# Patient Record
(demographics unavailable — no encounter records)

---

## 2017-06-05 NOTE — DIAGNOSTIC IMAGING REPORT
ABDOMINAL ULTRASOUND, RIGHT UPPER QUADRANT



HISTORY:     Right upper quadrant abdominal pain.



COMPARISON:  None.



FINDINGS: This exam is compromised by suboptimal penetration. Liver morphology

is normal and no hepatic lesions are identified although sensitivity is

diminished on this exam. The pancreas is largely obscured. There is no

gallbladder wall thickening. Small echogenic focus within the gallbladder could

reflect a small polyp or stone. There is no biliary ductal dilatation. There is

no right hydronephrosis. There are suspected right renal calculi.



IMPRESSION: 





1. Study compromised by suboptimal penetration.



2. Suspected right-sided nephrolithiasis. No right hydronephrosis.



3. Obscured pancreas.



4. Small echogenic abnormality within the gallbladder which could reflect a

small polyp or gallstone. No evidence of acute cholecystitis.







Electronically signed by:  Ramon Zheng M.D.

6/5/2017 1:26 PM



Dictated Date/Time:  6/5/2017 1:24 PM

## 2017-06-05 NOTE — DIAGNOSTIC IMAGING REPORT
CT SCAN OF THE ABDOMEN AND PELVIS WITH IV CONTRAST



CLINICAL HISTORY:   Generalized abdominal pain.



COMPARISON STUDY:  Abdominal ultrasound dated 6/5/2017.



TECHNIQUE: Following the IV administration of  93 cc of Optiray 320, CT scan of

the abdomen and pelvis is performed from the lung bases to the proximal femora.

Images are reviewed in the axial, sagittal, and coronal planes. IV contrast was

administered without complication. Automated dose control exposure was utilized.



CT DOSE: 598.17 mGy.cm



FINDINGS:



Lung bases: The heart is normal in size and without pericardial effusion. The

lung bases are clear.



Liver: The contrast-enhanced liver is normal in size, contour, and attenuation.

There is no intrahepatic biliary ductal dilatation. The hepatic veins and portal

veins are patent.



Gallbladder: There are small calcified gallstones. The gallbladder is otherwise

normal in appearance.



Spleen: Normal in size and attenuation.



Pancreas: Unremarkable.



Adrenal glands: Unremarkable.



Kidneys: The contrast enhanced kidneys are normal in size and without

hydronephrosis. The kidneys enhance symmetrically. There are numerous greater

than 10) nonobstructing right renal calculi measuring up to 6 mm. No left renal

calculi are seen.



Abdominal vasculature: The abdominal aorta is normal in course and caliber.

There is a small fat-containing umbilical hernia.



Bowel: The small bowel and colon are normal in course and caliber. The appendix

is  well-visualized and normal. 



Peritoneum: There is no intraperitoneal free air or abdominal ascites.



Lymphadenopathy: None.



Pelvic viscera: There are small foci of gas within the bladder lumen. The

bladder, uterus, and adnexa are otherwise normal as visualized. Bilateral

ovarian follicles are noted.



Skeletal structures: No lytic or blastic lesions are seen.





IMPRESSION:



1. There are small foci of gas within the bladder lumen. No bladder wall

thickening is identified. This is nonspecific and may be related to recent

instrumentation. Correlate clinically and with urinalysis for evidence of

cystitis.



2. Tiny calcified gallstones are observed.



3. There are numerous nonobstructing right renal calculi.



4. Additional findings as above.







Electronically signed by:  Stevan Quick M.D.

6/5/2017 3:08 PM



Dictated Date/Time:  6/5/2017 3:02 PM

## 2017-06-05 NOTE — EMERGENCY ROOM VISIT NOTE
History


Report prepared by Matt:  Komal Armas


Under the Supervision of:  Dr. Won Bro M.D.


First contact with patient:  11:01


Chief Complaint:  ABDOMINAL PAIN


Stated Complaint:  SEVERE ABD. PAIN, OVER HEATED, NAUSEA


Nursing Triage Summary:  


Back pain yesterday, not present this morning, but abdominal cramps around 

0900. 


Getting worse. Occassionally feeling overheated and going to pass out.





History of Present Illness


The patient is a 31 year old female who presents to the Emergency Room with 

complaints of worsening diffuse abdominal pain for the past 2 hours. She states 

that her pain waxes and wanes and rates her pain as a 10/10 in severity. She is 

also nauseated and lightheaded. She has never experienced pain like this 

before. She has not taken anything for her pain. Last night she was 

experiencing back pain but denies any back pain at this time. She also denies 

urinary symptoms and abnormal vaginal bleeding or discharge. The patient cannot 

deny chance of pregnancy. She notes that she should start her period in about 2 

days.





   Source of History:  patient


   Onset:  2 hours ago


   Position:  abdomen


   Symptom Intensity:  10/10


   Timing:  waxes/wanes, worsening


   Associated Symptoms:  + nausea, No back pain, No urinary symptoms


Note:


Pt notes lightheadedness. Pt denies any abnormal vaginal discharge or bleeding.





Review of Systems


See HPI for pertinent positives & negatives. A total of 10 systems reviewed and 

were otherwise negative.





Past Medical & Surgical


Surgical Problems:


(1) Hx of  section








Family History





Patient reports no known family medical history.





Social History


Smoking Status:  Former Smoker


Smokeless Tobacco Use:  No


Alcohol Use:  occasionally


Marital Status:  in relationship


Housing Status:  lives with significant other


Occupation Status:  employed





Current/Historical Medications


Scheduled


Bupropion (Wellbutrin-Xl), 300 MG PO BID


Multivitamin (Multivitamin), 1 TAB PO DAILY


Sulfamethoxazole-Trimethoprim (Bactrim Ds 800MG/160MG), 1 TAB PO BID





Scheduled PRN


Oxycodone/Acetaminophen 5MG/325MG (Percocet 5MG/325MG), 1-2 TAB PO Q4H PRN for 

Pain





Allergies


Coded Allergies:  


     No Known Allergies (Unverified , 17)





Physical Exam


Vital Signs











  Date Time  Temp Pulse Resp B/P (MAP) Pulse Ox O2 Delivery O2 Flow Rate FiO2


 


17 15:52  64 16 126/87 96   


 


17 15:09  62 15  98   


 


17 15:04    114/73    


 


17 15:00  66 18 114/73 99 Room Air  


 


17 14:09  65 17  98   


 


17 14:02    110/80    


 


17 13:39  86      


 


17 13:39  62 16  97   


 


17 13:34    121/79    


 


17 12:32    118/80    


 


17 12:12  80 17  95   


 


17 12:02    133/88    


 


17 11:53    141/97    


 


17 11:42  69 15  96   


 


17 11:41    138/85    


 


17 11:12  73 18  96   


 


17 11:04  65      


 


17 11:02    131/94    


 


17 10:46 36.4 83 20 127/78 97 Room Air  











Physical Exam


GENERAL: Patient is a healthy-appearing well-nourished 31 year old female. 


HEAD: Normocephalic atraumatic


EYES: Ocular movements intact pupils equal and react to light


OROPHARYNX mucous membranes are moist no exudates present no erythema or edema 

present


NECK: Supple no nuchal rigidity


CHEST: Good equal expansion


LUNGS: Clear and equal to auscultation


CARDIAC: Normal S1 and S2


ABDOMEN: Soft, diffusely tender throughout the entire abdomen, no guarding


BACK: No CVA tenderness


EXTREMITIES: No pain upon palpation normal muscle strength in all groups no 

clubbing cyanosis or edema


NEURO: Patient is following commands is answering questions appropriately. 

Alert and oriented x3 Cranial Nerves 2-12 grossly intact





Medical Decision & Procedures


ER Provider


Diagnostic Interpretation:


Radiology results as stated below per my review and radiologist interpretation:





CT SCAN OF THE ABDOMEN AND PELVIS WITH IV CONTRAST





CLINICAL HISTORY:   Generalized abdominal pain.





COMPARISON STUDY:  Abdominal ultrasound dated 2017.





TECHNIQUE: Following the IV administration of  93 cc of Optiray 320, CT scan of


the abdomen and pelvis is performed from the lung bases to the proximal femora.


Images are reviewed in the axial, sagittal, and coronal planes. IV contrast was


administered without complication. Automated dose control exposure was utilized.





CT DOSE: 598.17 mGy.cm





FINDINGS:





Lung bases: The heart is normal in size and without pericardial effusion. The


lung bases are clear.





Liver: The contrast-enhanced liver is normal in size, contour, and attenuation.


There is no intrahepatic biliary ductal dilatation. The hepatic veins and portal


veins are patent.





Gallbladder: There are small calcified gallstones. The gallbladder is otherwise


normal in appearance.





Spleen: Normal in size and attenuation.





Pancreas: Unremarkable.





Adrenal glands: Unremarkable.





Kidneys: The contrast enhanced kidneys are normal in size and without


hydronephrosis. The kidneys enhance symmetrically. There are numerous greater


than 10) nonobstructing right renal calculi measuring up to 6 mm. No left renal


calculi are seen.





Abdominal vasculature: The abdominal aorta is normal in course and caliber.


There is a small fat-containing umbilical hernia.





Bowel: The small bowel and colon are normal in course and caliber. The appendix


is  well-visualized and normal. 





Peritoneum: There is no intraperitoneal free air or abdominal ascites.





Lymphadenopathy: None.





Pelvic viscera: There are small foci of gas within the bladder lumen. The


bladder, uterus, and adnexa are otherwise normal as visualized. Bilateral


ovarian follicles are noted.





Skeletal structures: No lytic or blastic lesions are seen.








IMPRESSION:





1. There are small foci of gas within the bladder lumen. No bladder wall


thickening is identified. This is nonspecific and may be related to recent


instrumentation. Correlate clinically and with urinalysis for evidence of


cystitis.





2. Tiny calcified gallstones are observed.





3. There are numerous nonobstructing right renal calculi.





4. Additional findings as above.











Electronically signed by:  Stevan Quick M.D.


2017 3:08 PM





Dictated Date/Time:  2017 3:02 PM














ABDOMINAL ULTRASOUND, RIGHT UPPER QUADRANT





HISTORY:     Right upper quadrant abdominal pain.





COMPARISON:  None.





FINDINGS: This exam is compromised by suboptimal penetration. Liver morphology


is normal and no hepatic lesions are identified although sensitivity is


diminished on this exam. The pancreas is largely obscured. There is no


gallbladder wall thickening. Small echogenic focus within the gallbladder could


reflect a small polyp or stone. There is no biliary ductal dilatation. There is


no right hydronephrosis. There are suspected right renal calculi.





IMPRESSION: 








1. Study compromised by suboptimal penetration.





2. Suspected right-sided nephrolithiasis. No right hydronephrosis.





3. Obscured pancreas.





4. Small echogenic abnormality within the gallbladder which could reflect a


small polyp or gallstone. No evidence of acute cholecystitis.











Electronically signed by:  Ramon Zheng M.D.


2017 1:26 PM





Dictated Date/Time:  2017 1:24 PM





Laboratory Results


17 11:10








Red Blood Count 4.40, Mean Corpuscular Volume 90.2, Mean Corpuscular Hemoglobin 

29.1, Mean Corpuscular Hemoglobin Concent 32.2, Mean Platelet Volume 9.8, 

Neutrophils (%) (Auto) 67.8, Lymphocytes (%) (Auto) 22.8, Monocytes (%) (Auto) 

7.0, Eosinophils (%) (Auto) 1.8, Basophils (%) (Auto) 0.3, Neutrophils # (Auto) 

4.17, Lymphocytes # (Auto) 1.40, Monocytes # (Auto) 0.43, Eosinophils # (Auto) 

0.11, Basophils # (Auto) 0.02





17 11:10

















Test


  17


11:10 17


11:55


 


White Blood Count


  6.15 K/uL


(4.8-10.8) 


 


 


Red Blood Count


  4.40 M/uL


(4.2-5.4) 


 


 


Hemoglobin


  12.8 g/dL


(12.0-16.0) 


 


 


Hematocrit 39.7 % (37-47)  


 


Mean Corpuscular Volume


  90.2 fL


() 


 


 


Mean Corpuscular Hemoglobin


  29.1 pg


(25-34) 


 


 


Mean Corpuscular Hemoglobin


Concent 32.2 g/dl


(32-36) 


 


 


Platelet Count


  197 K/uL


(130-400) 


 


 


Mean Platelet Volume


  9.8 fL


(7.4-10.4) 


 


 


Neutrophils (%) (Auto) 67.8 %  


 


Lymphocytes (%) (Auto) 22.8 %  


 


Monocytes (%) (Auto) 7.0 %  


 


Eosinophils (%) (Auto) 1.8 %  


 


Basophils (%) (Auto) 0.3 %  


 


Neutrophils # (Auto)


  4.17 K/uL


(1.4-6.5) 


 


 


Lymphocytes # (Auto)


  1.40 K/uL


(1.2-3.4) 


 


 


Monocytes # (Auto)


  0.43 K/uL


(0.11-0.59) 


 


 


Eosinophils # (Auto)


  0.11 K/uL


(0-0.5) 


 


 


Basophils # (Auto)


  0.02 K/uL


(0-0.2) 


 


 


RDW Standard Deviation


  51.3 fL


(36.4-46.3) 


 


 


RDW Coefficient of Variation


  15.6 %


(11.5-14.5) 


 


 


Immature Granulocyte % (Auto) 0.3 %  


 


Immature Granulocyte # (Auto)


  0.02 K/uL


(0.00-0.02) 


 


 


Anion Gap


  8.0 mmol/L


(3-11) 


 


 


Est Creatinine Clear Calc


Drug Dose 91.6 ml/min 


  


 


 


Estimated GFR (


American) 93.7 


  


 


 


Estimated GFR (Non-


American 80.8 


  


 


 


BUN/Creatinine Ratio 9.1 (10-20)  


 


Calcium Level


  8.7 mg/dl


(8.5-10.1) 


 


 


Total Bilirubin


  0.4 mg/dl


(0.2-1) 


 


 


Direct Bilirubin


  0.1 mg/dl


(0-0.2) 


 


 


Aspartate Amino Transf


(AST/SGOT) 19 U/L (15-37) 


  


 


 


Alanine Aminotransferase


(ALT/SGPT) 22 U/L (12-78) 


  


 


 


Alkaline Phosphatase


  69 U/L


() 


 


 


Total Protein


  7.6 gm/dl


(6.4-8.2) 


 


 


Albumin


  3.7 gm/dl


(3.4-5.0) 


 


 


Lipase


  187 U/L


() 


 


 


Urine Color  YELLOW 


 


Urine Appearance  CLOUDY (CLEAR) 


 


Urine pH  8.0 (4.5-7.5) 


 


Urine Specific Gravity


  


  1.020


(1.000-1.030)


 


Urine Protein  NEG (NEG) 


 


Urine Glucose (UA)  NEG (NEG) 


 


Urine Ketones  TRACE (NEG) 


 


Urine Occult Blood  TRACE (NEG) 


 


Urine Nitrite  NEG (NEG) 


 


Urine Bilirubin  NEG (NEG) 


 


Urine Urobilinogen  NEG (NEG) 


 


Urine Leukocyte Esterase  SMALL (NEG) 


 


Urine WBC (Auto)


  


  10-30 /hpf


(0-5)


 


Urine RBC (Auto)  0-4 /hpf (0-4) 


 


Urine Hyaline Casts (Auto)  1-5 /lpf (0-5) 


 


Urine Epithelial Cells (Auto)  >30 /lpf (0-5) 


 


Urine Bacteria (Auto)  1+ (NEG) 


 


Urine Pregnancy Test  NEG (NEG) 





Labs reviewed by ED physician.





Medications Administered











 Medications


  (Trade)  Dose


 Ordered  Sig/Candida


 Route  Start Time


 Stop Time Status Last Admin


Dose Admin


 


 Sodium Chloride  1,000 ml @ 


 999 mls/hr  Q1H1M STAT


 IV  17 11:30


 17 12:30 DC 17 11:46


999 MLS/HR


 


 Ketorolac


 Tromethamine


  (Toradol Inj)  30 mg  NOW  STAT


 IV  17 11:30


 17 11:33 DC 17 11:45


30 MG


 


 Hydromorphone HCl


  (Dilaudid Inj)  1 mg  NOW  STAT


 IV  17 11:30


 17 11:33 DC 17 11:46


1 MG


 


 Metoclopramide HCl


  (Reglan Inj)  10 mg  NOW  STAT


 IV  17 11:30


 17 11:33 DC 17 11:45


10 MG


 


 Ceftriaxone Sodium


  (Rocephin Inj)  1 gm  NOW  STAT


 IV  17 12:49


 17 12:50 DC 17 13:35


1 GM











ED Course


1101: Past medical records reviewed. The patient was evaluated in room B11B. A 

complete history and physical examination was performed. 





1130: Reglan 10 mg IV, Dilaudid 1 mg IV, Toradol 30 mg IV, NSS 1000 ml @ 999 mls

/hr IV 





1249: Rocephin 1 gm IV





1529: I reassessed the patient at this time. She is feeling better and resting 

comfortably. I discussed the results and treatment plan with the patient. I 

answered all pertaining questions that she had. She expressed understanding and 

verbalized agreement. The patient will be discharged home.





Medical Decision


Etiologies such as appendicitis, diverticulitis, PUD, biliary pathology, UTI, 

pancreatitis, obstruction, mesenteric ischemia, aortic pathology, infections, 

inflammatory bowel disease, renal colic, as well as others were entertained. 





Medication Reconciliation: I attest that I have personally reviewed the patient'

s current medication list.





Blood Pressure Screening: Patient was found to have an elevated blood pressure 

and was referred to their primary care doctor for recheck and further treatment.





This is a 31-year-old female who presents emergency department complaining of 

right-sided abdominal pain.  Serial abdominal examinations were performed on 

the patient in the emergency department and at no tender the patient exhibit a 

surgical abdomen.  Based on these findings I felt that the patient can be 

safely discharged home.  She has a normal CBC normal renal profile normal liver 

profile normal lipase.  The patient has a large amount of blood in her urine 

and I believe her CAT scan is consistent with a recently passed kidney stone.  

For this reason the patient will be placed on Percocet for home as well as 

Bactrim.  The patient was given a dose of Rocephin here in emergency 

department.  She is not pregnant.  She will follow-up with urology if she is 

continuing to have pain.  Patient was in agreement with the treatment plan.





Impression





 Primary Impression:  


 Rt flank pain





Scribe Attestation


The scribe's documentation has been prepared under my direction and personally 

reviewed by me in its entirety. I confirm that the note above accurately 

reflects all work, treatment, procedures, and medical decision making performed 

by me.





Departure Information


Dispostion


Home / Self-Care





Prescriptions





Sulfamethoxazole-Trimethoprim (Bactrim Ds 800MG/160MG) 1 Tab Tab


1 TAB PO BID for 10 Days, #20 TAB


   Prov: Won Bro MD         17 


Oxycodone/Acetaminophen 5MG/325MG (PERCOCET 5MG/325MG)  Tab


1-2 TAB PO Q4H Y for Pain, #14 TAB


   Prov: Won Bro MD         17





Referrals


No Doctor, Assigned (PCP)








Germán Welch MD, Urology





Forms


Call Back Authorization, HOME CARE DOCUMENTATION FORM,                         

                                       IMPORTANT VISIT INFORMATION





Patient Instructions


Kidney Stones Eval, Kidney Stones Expectant Therapy, Kidney Stones Prevent, 

Kidney Stones Risk, My [facility]





Additional Instructions





You were found to have an elevated blood pressure today (>120 sytolic or >90 

diastolic). Per medicare guidelines, you need to follow up with this blood 

pressure screening with your Primary Care Physician (PCP). For a new PCP call 

[phone].   





You received narcotic or benzodiazepene medication while in the emergency room 

today.  Do not drive, operate heavy machinery, or drink alcohol under the 

influence of this medication.  





Take Alieve as directed


Take Percocet for breakthrough pain 





Culture results are usually available in approx 48 hours








You have been examined and treated today on an emergency basis only. This is 

not a substitute for, or an effort to provide, complete comprehensive medical 

care. It is impossible to recognize and treat all injuries or illnesses in a 

single emergency department visit. It is therefore important that you follow up 

closely with your PCP.  Call as soon as possible for an appointment.  





Thank you for your time and consideration.  I look forward to speaking with you 

again soon.  Please don't hesitate to call us if you have any questions.